# Patient Record
Sex: FEMALE | Race: WHITE | NOT HISPANIC OR LATINO | Employment: FULL TIME | URBAN - METROPOLITAN AREA
[De-identification: names, ages, dates, MRNs, and addresses within clinical notes are randomized per-mention and may not be internally consistent; named-entity substitution may affect disease eponyms.]

---

## 2023-11-27 ENCOUNTER — HOSPITAL ENCOUNTER (OUTPATIENT)
Dept: RADIOLOGY | Facility: CLINIC | Age: 23
Discharge: HOME | End: 2023-11-27
Payer: COMMERCIAL

## 2023-11-27 DIAGNOSIS — M25.512 ACUTE PAIN OF LEFT SHOULDER: ICD-10-CM

## 2023-11-27 PROCEDURE — 73030 X-RAY EXAM OF SHOULDER: CPT | Mod: LT

## 2023-11-27 PROCEDURE — 73030 X-RAY EXAM OF SHOULDER: CPT | Mod: LEFT SIDE | Performed by: RADIOLOGY

## 2024-04-26 DIAGNOSIS — M25.311 SHOULDER INSTABILITY, RIGHT: ICD-10-CM

## 2024-04-29 ENCOUNTER — HOSPITAL ENCOUNTER (OUTPATIENT)
Dept: RADIOLOGY | Facility: HOSPITAL | Age: 24
Discharge: HOME | End: 2024-04-29
Payer: COMMERCIAL

## 2024-04-29 ENCOUNTER — HOSPITAL ENCOUNTER (OUTPATIENT)
Dept: RADIOLOGY | Facility: CLINIC | Age: 24
Discharge: HOME | End: 2024-04-29
Payer: COMMERCIAL

## 2024-04-29 DIAGNOSIS — M25.311 SHOULDER INSTABILITY, RIGHT: ICD-10-CM

## 2024-04-29 PROCEDURE — 73221 MRI JOINT UPR EXTREM W/O DYE: CPT | Mod: RIGHT SIDE | Performed by: RADIOLOGY

## 2024-04-29 PROCEDURE — 73030 X-RAY EXAM OF SHOULDER: CPT | Mod: RT

## 2024-04-29 PROCEDURE — 73221 MRI JOINT UPR EXTREM W/O DYE: CPT | Mod: RT

## 2024-04-29 PROCEDURE — 73030 X-RAY EXAM OF SHOULDER: CPT | Mod: RIGHT SIDE | Performed by: RADIOLOGY

## 2024-04-30 ENCOUNTER — APPOINTMENT (OUTPATIENT)
Dept: RADIOLOGY | Facility: HOSPITAL | Age: 24
End: 2024-04-30
Payer: COMMERCIAL

## 2024-05-02 ENCOUNTER — OFFICE VISIT (OUTPATIENT)
Dept: ORTHOPEDIC SURGERY | Facility: CLINIC | Age: 24
End: 2024-05-02
Payer: COMMERCIAL

## 2024-05-02 DIAGNOSIS — M25.511 RIGHT SHOULDER PAIN, UNSPECIFIED CHRONICITY: ICD-10-CM

## 2024-05-02 PROCEDURE — 99204 OFFICE O/P NEW MOD 45 MIN: CPT | Performed by: STUDENT IN AN ORGANIZED HEALTH CARE EDUCATION/TRAINING PROGRAM

## 2024-05-07 ENCOUNTER — OFFICE VISIT (OUTPATIENT)
Dept: DERMATOLOGY | Facility: CLINIC | Age: 24
End: 2024-05-07
Payer: COMMERCIAL

## 2024-05-07 DIAGNOSIS — L42 PITYRIASIS ROSEA: Primary | ICD-10-CM

## 2024-05-07 PROCEDURE — 99213 OFFICE O/P EST LOW 20 MIN: CPT | Performed by: DERMATOLOGY

## 2024-05-07 RX ORDER — CLOTRIMAZOLE 1 %
CREAM (GRAM) TOPICAL 2 TIMES DAILY
COMMUNITY

## 2024-05-07 RX ORDER — TRIAMCINOLONE ACETONIDE 1 MG/G
CREAM TOPICAL
Qty: 80 G | Refills: 0 | Status: SHIPPED | OUTPATIENT
Start: 2024-05-07

## 2024-05-07 NOTE — PROGRESS NOTES
Subjective     Neha Varela is a 23 y.o. female who presents for the following: Suspicious Skin Lesion (Abdomen, arms, axillae, back, buttocks, right thigh - 2 weeks, Clotrimazole cream 1% with some improvement, but still spreading).   Not itching, painful or weeping. Predominantly abdomen, but do involve other areas as well.     Review of Systems:  No other skin or systemic complaints other than what is documented elsewhere in the note.    The following portions of the chart were reviewed this encounter and updated as appropriate:          Skin Cancer History  No skin cancer on file.      Specialty Problems    None       Objective   Well appearing patient in no apparent distress; mood and affect are within normal limits.    A focused skin examination was performed of the face, chest, abdomen, left and right upper extremities. All findings within normal limits unless otherwise noted below.    Assessment/Plan   1. Pityriasis rosea  Resolving hypopigmented patch on the left forearm.  Multiple pink to skin colored papules and plaques with collarette of scale    The nature of the diagnosis was reviewed with the patient today.  Pityriasis rosea is not a contagious rash, is self-limiting (typically self resolves in 6-10 weeks) and may or may not be symptomatic.  It is likely an immune response to an asymptomatic viral infection.    Can start triamcinolone 0.1% cream 2x daily x 2 weeks to decrease appearance, otherwise with time will likely resolve.    If not improved or worsening after 6-8 weeks please contact office.    triamcinolone (Kenalog) 0.1 % cream  Apply to spots on the abdomen and arms 2x daily x 2 weeks then stop

## 2024-05-22 NOTE — PROGRESS NOTES
"PRIMARY CARE PHYSICIAN: Astrid Hanson MD  REFERRING PROVIDER: No referring provider defined for this encounter.     CONSULT ORTHOPAEDIC: Shoulder Evaluation    ASSESSMENT & PLAN    Impression/Plan: This is a 23-year-old female who is a member of the [facility] team presenting for evaluation of greater than 1 year of right shoulder subjective instability.  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  At this time, I recommended continued nonoperative management with continued physical therapy to work on periscapular musculature.  We discussed the diagnosis of MDI and the need for persistent physical therapy to stabilize the shoulder.  She will follow-up in [facility] training room in the summer prior to her senior season.    SUBJECTIVE  CHIEF COMPLAINT:   Chief Complaint   Patient presents with    Right Shoulder - Follow-up     Right shoulder MRI review        HPI: Neha Varela is a 23 y.o. patient. Neha Varela presents for evaluation of greater than 1 year of right shoulder instability.  She reports a chronic history of shoulder subluxation events.  She denies any shira dislocation and no episodes that required a closed reduction.  The pain is global and extends anteriorly, posteriorly, and laterally.  She does state that she is \"double jointed\".  She has no previous history of dislocation events to other aspects of her body.  No reports of right shoulder surgery.  She has been in physical therapy with her training staff at [facility] since being evaluated initially last summer.  She reports mild improvements but continues to have daily pain and subjective instability.  She denies any distal numbness or tingling.    REVIEW OF SYSTEMS  Constitutional: See HPI for pain assessment, No significant weight loss, recent trauma  Cardiovascular: No chest pain, shortness of breath  Respiratory: No " difficulty breathing, cough  Gastrointestinal: No nausea, vomiting, diarrhea, constipation  Musculoskeletal: Noted in HPI, positive for pain, restricted motion, stiffness  Integumentary: No rashes, easy bruising, redness   Neurological: no numbness or tingling in extremities, no gait disturbances   Psychiatric: No mood changes, memory changes, social issues  Heme/Lymph: no excessive swelling, easy bruising, excessive bleeding  ENT: No hearing changes  Eyes: No vision changes    Past Medical History:   Diagnosis Date    Pain in unspecified ankle and joints of unspecified foot     Ankle pain    Personal history of other infectious and parasitic diseases     History of chickenpox    Personal history of pneumonia (recurrent)     History of pneumonia    Personal history of traumatic brain injury 01/17/2017    History of concussion        Allergies   Allergen Reactions    Milk Containing Products (Dairy) Diarrhea        No past surgical history on file.     No family history on file.     Social History     Socioeconomic History    Marital status: Single     Spouse name: Not on file    Number of children: Not on file    Years of education: Not on file    Highest education level: Not on file   Occupational History    Not on file   Tobacco Use    Smoking status: Never    Smokeless tobacco: Never   Substance and Sexual Activity    Alcohol use: Not on file    Drug use: Not on file    Sexual activity: Not on file   Other Topics Concern    Not on file   Social History Narrative    Not on file     Social Determinants of Health     Financial Resource Strain: Not on file   Food Insecurity: Not on file   Transportation Needs: Not on file   Physical Activity: Not on file   Stress: Not on file   Social Connections: Not on file   Intimate Partner Violence: Not on file   Housing Stability: Not on file        CURRENT MEDICATIONS:   Current Outpatient Medications   Medication Sig Dispense Refill    cetirizine HCl (CETIRIZINE ORAL) Take by  mouth.      clotrimazole (Lotrimin) 1 % cream Apply topically 2 times a day.      triamcinolone (Kenalog) 0.1 % cream Apply to spots on the abdomen and arms 2x daily x 2 weeks then stop 80 g 0     No current facility-administered medications for this visit.        OBJECTIVE    PHYSICAL EXAM      1/3/2023    10:09 AM   Vitals   Systolic 122   Diastolic 75   Heart Rate 64   Weight (lb) 135      There is no height or weight on file to calculate BMI.    GENERAL: A/Ox3, NAD. Appears healthy, well nourished  PSYCHIATRIC: Mood stable, appropriate memory recall  EYES: EOM intact, no scleral icterus  CARDIAC: regular rate  LUNGS: Breathing non-labored  SKIN: no erythema, rashes, or ecchymoses     MUSCULOSKELETAL:  This is a well-appearing patient in no acute distress.  Beighton: 5/9  Positive sulcus sign to the right shoulder.  There is no tenderness to palpation along the SC joint, AC joint, clavicle, acromion, or spine of the scapula.  There is no tenderness along the proximal aspect of the biceps tendon.  Active range of motion: forward flexion 160 degrees, abduction 150 degrees, external rotation 45 degrees, internal rotation to T7.  Strength: 5/5 to the supraspinatus, infraspinatus, subscapularis.  Negative Hornblower's.  Stability: Anterior/Posterior load and shift stable  Negative Speed's and Yergason's.  Negative Bee's.  Negative Neer and Marin.  The patient is firing the AIN/PIN/median/radial/ulnar/axillary distributions.  The patient is sensate to light touch in the median/radial/ulnar/axillary distributions.  2+ radial pulse.    NEUROVASCULAR:  - Neurovascular Status: sensation intact to light touch distally, upper extremity motor grossly intact  - Capillary refill brisk at extremities, radial pulse 2+    Imaging: Multiple views of the affected right shoulder(s) demonstrate: No evidence of fracture or dislocation.  There is well-preserved glenohumeral joint space.  Normal acromiohumeral interval.   X-rays  were personally reviewed and interpreted by me.  Radiology reports were reviewed by me as well, if readily available at the time.    MRI of the right shoulder dated 4/29/2024 reveals tearing at the chondral labral junction of the posterior glenoid/labrum.  There is edema in the lateral clavicle near the acromioclavicular joint.  Otherwise unremarkable MRI of the right shoulder.    Marvin Sun MD, MPH  Attending Surgeon  Sports Medicine Orthopaedic Surgery  [facility] [place]

## 2024-06-14 ENCOUNTER — TELEPHONE (OUTPATIENT)
Dept: ORTHOPEDIC SURGERY | Facility: HOSPITAL | Age: 24
End: 2024-06-14
Payer: COMMERCIAL

## 2024-06-14 NOTE — TELEPHONE ENCOUNTER
I left a voicemail asking Neha to call the office back so that we can set up an appointment ( office visit) to discuss her shoulder.

## 2024-06-18 ENCOUNTER — TELEPHONE (OUTPATIENT)
Dept: ORTHOPEDIC SURGERY | Facility: HOSPITAL | Age: 24
End: 2024-06-18
Payer: COMMERCIAL

## 2024-06-18 NOTE — TELEPHONE ENCOUNTER
Neha called and we were going back and forth with 3 calls to make her appointment, but she is in [place] and there is bad reception where she is at. She will call back when she can to try and make the appointment.

## 2024-07-29 ENCOUNTER — TELEPHONE (OUTPATIENT)
Dept: ORTHOPEDIC SURGERY | Facility: HOSPITAL | Age: 24
End: 2024-07-29
Payer: COMMERCIAL

## 2024-07-29 NOTE — TELEPHONE ENCOUNTER
Left another message regarding her shoulder and needs an appointment. Left voicemail on [phone].

## 2024-08-06 ENCOUNTER — APPOINTMENT (OUTPATIENT)
Dept: ORTHOPEDIC SURGERY | Facility: CLINIC | Age: 24
End: 2024-08-06
Payer: COMMERCIAL

## 2024-08-06 DIAGNOSIS — M25.311 MULTIDIRECTIONAL INSTABILITY OF RIGHT GLENOHUMERAL JOINT: Primary | ICD-10-CM

## 2024-08-06 PROCEDURE — 99213 OFFICE O/P EST LOW 20 MIN: CPT | Performed by: STUDENT IN AN ORGANIZED HEALTH CARE EDUCATION/TRAINING PROGRAM

## 2024-08-06 ASSESSMENT — PAIN SCALES - GENERAL: PAINLEVEL_OUTOF10: 4

## 2024-08-06 ASSESSMENT — PAIN - FUNCTIONAL ASSESSMENT: PAIN_FUNCTIONAL_ASSESSMENT: 0-10

## 2024-08-06 ASSESSMENT — PAIN DESCRIPTION - DESCRIPTORS: DESCRIPTORS: DISCOMFORT

## 2024-08-06 NOTE — PROGRESS NOTES
PRIMARY CARE PHYSICIAN: No primary care provider on file.    ORTHOPAEDIC FOLLOW-UP: Shoulder Evaluation        ASSESSMENT & PLAN    Impression: 24 y.o. female presenting for follow-up evaluation of greater than 1 year of right shoulder multidirectional instability.  At this time, she has failed greater than 6 months of physical therapy and continues to have daily subjective instability and pain.  We discussed at this time we may consider operative intervention with capsular plication and rotator interval closure.  Risk of the procedure include but are not limited to blood loss, infection, nerve damage, persistent pain, persistent instability, loss of motion, recurrent surgery, blood clot, and death.  At this time she would like to consider surgical options and will contact the office with how she would like to proceed.  All questions were answered.    R shoulder capsular plication and posterior labrum, December. Josie Call.    Plan:   I reviewed with the patient the nature of their diagnosis.  I reviewed their imaging studies with them.    Based on the history, physical exam and imaging studies above, the patient's presentation is consistent with the above diagnosis.  I had a long discussion with the patient regarding their presentation and the treatment options.  We discussed initial nonoperative versus operative management options as well as potential further diagnostic imaging.     Follow-Up: Patient will follow-up for surgical intervention when he elected for.    At the end of the visit, all questions were answered in full. The patient is in agreement with the plan and recommendations. They will call the office with any questions/concerns.      Note dictated with NuMedii software. Completed without full typed error editing and sent to avoid delay.       SUBJECTIVE  CHIEF COMPLAINT:   Chief Complaint   Patient presents with    Right Shoulder - Follow-up     Is still going to PT but stats not  nilay.         HPI: Neha Varela is a 24 y.o. patient. Neha Varela is a member of the [employer] who presents for greater than 1 year of right shoulder subjective instability and pain.  She has been participating in physical therapy for greater than 1 year and reports minimal relief from her symptoms.  She has daily instability events associated with pain.  She is unable to participate in cheer/stop because of these issues.  This issue has been bothering her daily including with nonathletic activities and she presents for discussion of further management options.    REVIEW OF SYSTEMS  Constitutional: See HPI for pain assessment, No significant weight loss, recent trauma  Cardiovascular: No chest pain, shortness of breath  Respiratory: No difficulty breathing, cough  Gastrointestinal: No nausea, vomiting, diarrhea, constipation  Musculoskeletal: Noted in HPI, positive for pain, restricted motion, stiffness  Integumentary: No rashes, easy bruising, redness   Neurological: no numbness or tingling in extremities, no gait disturbances   Psychiatric: No mood changes, memory changes, social issues  Heme/Lymph: no excessive swelling, easy bruising, excessive bleeding  ENT: No hearing changes  Eyes: No vision changes    Past Medical History:   Diagnosis Date    Pain in unspecified ankle and joints of unspecified foot     Ankle pain    Personal history of other infectious and parasitic diseases     History of chickenpox    Personal history of pneumonia (recurrent)     History of pneumonia    Personal history of traumatic brain injury 01/17/2017    History of concussion        Allergies   Allergen Reactions    Milk Containing Products (Dairy) Diarrhea        History reviewed. No pertinent surgical history.     No family history on file.     Social History     Socioeconomic History    Marital status: Single     Spouse name: Not on file    Number of children: Not on file    Years of education: Not on file     Highest education level: Not on file   Occupational History    Not on file   Tobacco Use    Smoking status: Never    Smokeless tobacco: Never   Substance and Sexual Activity    Alcohol use: Not on file    Drug use: Not on file    Sexual activity: Not on file   Other Topics Concern    Not on file   Social History Narrative    Not on file     Social Determinants of Health     Financial Resource Strain: Not on file   Food Insecurity: Not on file   Transportation Needs: Not on file   Physical Activity: Not on file   Stress: Not on file   Social Connections: Not on file   Intimate Partner Violence: Not on file   Housing Stability: Not on file        CURRENT MEDICATIONS:   Current Outpatient Medications   Medication Sig Dispense Refill    cetirizine HCl (CETIRIZINE ORAL) Take by mouth.      clotrimazole (Lotrimin) 1 % cream Apply topically 2 times a day.      triamcinolone (Kenalog) 0.1 % cream Apply to spots on the abdomen and arms 2x daily x 2 weeks then stop 80 g 0     No current facility-administered medications for this visit.        OBJECTIVE    PHYSICAL EXAM      1/3/2023    10:09 AM   Vitals   Systolic 122   Diastolic 75   Heart Rate 64   Weight (lb) 135      There is no height or weight on file to calculate BMI.    GENERAL: A/Ox3, NAD. Appears healthy, well nourished  PSYCHIATRIC: Mood stable, appropriate memory recall  EYES: EOM intact, no scleral icterus  CARDIOVASCULAR: Palpable peripheral pulses  LUNGS: Breathing non-labored on room air  SKIN: no erythema, rashes, or ecchymosis     MUSCULOSKELETAL:  This is a well-appearing patient in no acute distress.  Beighton: 5/9  3+ sulcus sign to the right shoulder.  This is persistent with the shoulder in 30 degrees of external rotation.  There is no tenderness to palpation along the SC joint, AC joint, clavicle, acromion, or spine of the scapula.  There is no tenderness along the proximal aspect of the biceps tendon.  Active range of motion: forward flexion 160  degrees, abduction 150 degrees, external rotation 45 degrees, internal rotation to T7.  Strength: 5/5 to the supraspinatus, infraspinatus, subscapularis.  Negative Hornblower's.  Stability: Anterior/Posterior load and shift stable  Negative Speed's and Yergason's.  Negative [place]'s.  Negative Neer and Marin.  The patient is firing the AIN/PIN/median/radial/ulnar/axillary distributions.  The patient is sensate to light touch in the median/radial/ulnar/axillary distributions.  2+ radial pulse.    NEUROVASCULAR:  - Neurovascular Status: sensation intact to light touch distally, upper extremity motor grossly intact  - Capillary refill brisk at extremities, peripheral pulses 2+    Imaging: No new imaging obtained today.      Marvin Sun MD, MPH  Attending Surgeon    Sports Medicine Orthopaedic Surgery  [facility] [place]  [facility] [employer]

## 2024-08-13 ENCOUNTER — TELEPHONE (OUTPATIENT)
Dept: ORTHOPEDIC SURGERY | Facility: HOSPITAL | Age: 24
End: 2024-08-13
Payer: COMMERCIAL

## 2024-08-13 NOTE — TELEPHONE ENCOUNTER
Left a message for Neha to call back so that we can figure out a surgery date if she has made up her mind.

## 2024-08-22 ENCOUNTER — TELEPHONE (OUTPATIENT)
Dept: ORTHOPEDIC SURGERY | Facility: HOSPITAL | Age: 24
End: 2024-08-22
Payer: COMMERCIAL

## 2024-10-31 ENCOUNTER — PREP FOR PROCEDURE (OUTPATIENT)
Dept: ORTHOPEDIC SURGERY | Facility: CLINIC | Age: 24
End: 2024-10-31
Payer: COMMERCIAL

## 2024-10-31 DIAGNOSIS — M25.511 RIGHT SHOULDER PAIN, UNSPECIFIED CHRONICITY: Primary | ICD-10-CM

## 2024-10-31 DIAGNOSIS — M25.311 MULTIDIRECTIONAL INSTABILITY OF RIGHT GLENOHUMERAL JOINT: Primary | ICD-10-CM

## 2024-11-05 DIAGNOSIS — M25.511 RIGHT SHOULDER PAIN, UNSPECIFIED CHRONICITY: Primary | ICD-10-CM

## 2024-11-15 ENCOUNTER — CLINICAL SUPPORT (OUTPATIENT)
Dept: PREADMISSION TESTING | Facility: HOSPITAL | Age: 24
End: 2024-11-15
Payer: COMMERCIAL

## 2024-11-15 VITALS — HEIGHT: 65 IN | WEIGHT: 125 LBS | BODY MASS INDEX: 20.83 KG/M2

## 2024-11-15 DIAGNOSIS — M25.511 RIGHT SHOULDER PAIN, UNSPECIFIED CHRONICITY: ICD-10-CM

## 2024-11-15 RX ORDER — ASCORBIC ACID 500 MG
500 TABLET ORAL DAILY
COMMUNITY

## 2024-11-15 ASSESSMENT — ENCOUNTER SYMPTOMS
DYSPNEA AT REST: 0
VOMITING: 0
BRUISES/BLEEDS EASILY: 0
FEVER: 0
CHILLS: 0
WEAKNESS: 0
SHORTNESS OF BREATH: 0
TREMORS: 0
DIARRHEA: 0
WOUND: 0

## 2024-11-15 NOTE — CPM/PAT NURSE NOTE
CPM/PAT Nurse Note      Name: Neha Varela (Neha Varela)  /Age: 2000/ y.o.       Past Medical History:   Diagnosis Date    Pain in unspecified ankle and joints of unspecified foot     Ankle pain    Personal history of other infectious and parasitic diseases     History of chickenpox    Personal history of pneumonia (recurrent)     History of pneumonia    Personal history of traumatic brain injury 2017    History of concussion    Seasonal allergic reaction        Past Surgical History:   Procedure Laterality Date    UPPER GASTROINTESTINAL ENDOSCOPY      WISDOM TOOTH EXTRACTION Bilateral        Patient  has no history on file for sexual activity.    Family History   Problem Relation Name Age of Onset    Melanoma Maternal Grandfather         Allergies   Allergen Reactions    Milk Containing Products (Dairy) Diarrhea       Prior to Admission medications    Medication Sig Start Date End Date Taking? Authorizing Provider   ascorbic acid (Vitamin C) 500 mg tablet Take 1 tablet (500 mg) by mouth once daily.   Yes Historical Provider, MD   cetirizine HCl (CETIRIZINE ORAL) Take by mouth once daily as needed.   Yes Historical Provider, MD   CHOLECALCIFEROL, VITAMIN D3, ORAL Take by mouth once daily.   Yes Historical Provider, MD   MAGNESIUM ORAL Take by mouth once daily.   Yes Historical Provider, MD   ZINC ORAL Take by mouth once daily.   Yes Historical Provider, MD   triamcinolone (Kenalog) 0.1 % cream Apply to spots on the abdomen and arms 2x daily x 2 weeks then stop  Patient not taking: Reported on 11/15/2024 5/7/24   Ale Perla DO   clotrimazole (Lotrimin) 1 % cream Apply topically 2 times a day.  Patient not taking: Reported on 11/15/2024  11/15/24  Historical Provider, MD SILVA ROS:   Constitutional:    no fever   no chills  Neuro/Psych:    no weakness   no tremors  Eyes:    use of corrective lenses  Ears:    no hearing aides  Nose:   Mouth:   Throat:    no throat pain  Neck:    Cardio:    no chest pain   no dyspnea  Respiratory:    no shortness of breath  Endocrine:   GI:    no diarrhea   no vomiting  :   Musculoskeletal:   Hematologic:    does not bruise/bleed easily   no history of blood transfusion  Skin:   no sores/wound   no rash      DASI Risk Score    No data to display       Caprini DVT Assessment    No data to display       Modified Frailty Index    No data to display       CHADS2 Stroke Risk  Current as of 6 days ago        N/A 3 to 100%: High Risk   2 to < 3%: Medium Risk   0 to < 2%: Low Risk     Last Change: N/A          This score determines the patient's risk of having a stroke if the patient has atrial fibrillation.        This score is not applicable to this patient. Components are not calculated.          Revised Cardiac Risk Index    No data to display       Apfel Simplified Score    No data to display       Risk Analysis Index Results This Encounter    No data found in the last 10 encounters.       Stop Bang Score      Flowsheet Row Clinical Support from 11/15/2024 in [facility]   Do you snore loudly? 0 filed at 11/15/2024 1535   Do you often feel tired or fatigued after your sleep? 0 filed at 11/15/2024 1535   Has anyone ever observed you stop breathing in your sleep? 0 filed at 11/15/2024 1535   Do you have or are you being treated for high blood pressure? 0 filed at 11/15/2024 1535   Recent BMI (Calculated) 20.8 filed at 11/15/2024 1535   Is BMI greater than 35 kg/m2? 0=No filed at 11/15/2024 1535   Age older than 50 years old? 0=No filed at 11/15/2024 1535   Gender - Male 0=No filed at 11/15/2024 1535          Prodigy: High Risk  Total Score: 0          ARISCAT Score for Postoperative Pulmonary Complications    No data to display       Mckenzie Perioperative Risk for Myocardial Infarction or Cardiac Arrest (JAKE)    No data to display         Nurse Plan of Action:

## 2024-11-15 NOTE — PREPROCEDURE INSTRUCTIONS
Current Medications   Medication Instructions    ascorbic acid (Vitamin C) 500 mg tablet Hold 7 days prior    cetirizine HCl (CETIRIZINE ORAL) Hold day of surgery    CHOLECALCIFEROL, VITAMIN D3, ORAL Hold 7 days prior to surgery    MAGNESIUM ORAL Hold 7 days prior to surgery    ZINC ORAL Hold 7 days prior to surgery                       NPO Instructions:    Do not eat any food after midnight the night before your surgery/procedure.    Additional Instructions:     Seven/Six Days before Surgery:  Review your medication instructions, stop indicated medications  Five Days before Surgery:  Review your medication instructions, stop indicated medications  Three Days before Surgery:  Review your medication instructions, stop indicated medications  The Day before Surgery:  Review your medication instructions, stop indicated medications  You will be contacted regarding the time of your arrival to facility and surgery time  Do not eat any food after Midnight  Day of Surgery:  Review your medication instructions, take indicated medications  Wear  comfortable loose fitting clothing  Do not use moisturizers, creams, lotions or perfume  All jewelry and valuables should be left at home  PAT PRE-OPERATIVE INSTRUCTIONS    [facility]  [id number] [place] [address]  [phone]    Please let your surgeon know if:      You develop:  Open sores, shingles, burning or painful urination as these may increase your risk of an infection.   Fever=100.4 or greater   New or worsening cold or flu symptoms ( cough, shortness of breath, sore throat, respiratory distress, headache, fatigue, GI symptoms)   You no longer wish to have the surgery.   Any other personal circumstances change that may lead to the need to cancel or defer this surgery-such as being sick or getting admitted to any hospital within one week of your planned procedure.    Your contact details change, such as a change of address or  phone number.    Starting now:     Please DO NOT drink alcohol or smoke for 24 hours before surgery. It is well known that quitting smoking can make a huge difference to your health and recovery from surgery. The longer you abstain from smoking, the better your chances of a healthy recovery. If you need help with quitting, call 5-706-QUIT-NOW to be connected to a trained counselor who will discuss the best methods to help you quit.     Before your surgery:    Please stop all supplements/ vitamins 7 days prior to surgery (or as directed by your surgeon).   Please stop taking NSAID pain medicine such as Advil, Ibuprofen, and Motrin 7 days before surgery.    If you develop any fever, cough, cold, rashes, cuts, scratches, scrapes, urinary symptoms or infection anywhere on your body (including teeth and gums) prior to surgery, please call your surgeon’s office as soon as possible. This may require treatment to reduce the chance of cancellation on the day of surgery.    The day before your surgery:   DIET- Do not eat any food after MIDNIGHT.   Get a good night’s rest.  Use the special soap for bathing if you have been instructed to use one.    Scheduled surgery times may change and you will be notified if this occurs - please check your personal voicemail for any updates.     On the morning of surgery:   Wear comfortable, loose fitting clothes which open in the front.   Shower and please do not wear moisturizers, creams, lotions, deodorants, makeup or perfume.    Please bring with you to surgery:   Photo ID and insurance card   Current list of medicines and allergies   Pacemaker/ Defibrillator/Heart stent cards as well as remote controls for implanted devices    CPAP machine and mask    Slings/ splints/ crutches   A copy of your complete advanced directive/DHPOA.    Please do NOT bring with you to surgery:   All jewelry and valuables should be left at home.   Prosthetic devices such as contact lenses, glasses, hearing  aids, dentures, eyelash extensions, hairpins and body piercings must be removed prior to going in to the surgical suite. If you have a case for these items, please bring it with you on surgery day.    *Patients under the age of 18: A responsible adult must be present and remaining in the building throughout the surgical visit.    After outpatient surgery:   A responsible adult MUST accompany you at the time of discharge and stay with you for 24 hours after your surgery. You may NOT drive yourself home after surgery.    Do not drive, operate machinery, make critical decisions or do activities that require co-ordination or balance until after a night’s sleep.   Do not drink alcoholic beverages for 24 hours.   Instructions for resuming your medications will be provided by your surgeon.    CALL YOUR DOCTOR AFTER SURGERY IF YOU HAVE:     Chills and/or a fever of 101° F or higher.    Redness, swelling, pus or drainage from your surgical wound or a bad smell from the wound.    Lightheadedness, fainting or confusion.    Persistent vomiting (throwing up) and are not able to eat or drink for 12 hours.    Three or more loose, watery bowel movements in 24 hours (diarrhea).   Difficulty or pain while urinating( after non-urological surgery)    Pain and swelling in your legs, especially if it is only on one side.    Difficulty breathing or are breathing faster than normal.    Any new concerning symptoms.      Reviewed pre-op instructions with patient, states understanding and denies further questions at this time.      Take Care                                                  (0) independent

## 2024-11-15 NOTE — PERIOPERATIVE NURSING NOTE
Spoke with patient and completed RN PAT screening call. Discussed preoperative surgical instructions and education. Chart updated per information provided by patient. Patient had no further questions at this time. In person PAT appointment not requested for patient per patient history.

## 2024-12-02 ENCOUNTER — APPOINTMENT (OUTPATIENT)
Dept: PREADMISSION TESTING | Facility: HOSPITAL | Age: 24
End: 2024-12-02
Payer: COMMERCIAL

## 2024-12-14 ENCOUNTER — ANESTHESIA EVENT (OUTPATIENT)
Dept: OPERATING ROOM | Facility: HOSPITAL | Age: 24
End: 2024-12-14
Payer: COMMERCIAL

## 2024-12-14 RX ORDER — ONDANSETRON 4 MG/1
4 TABLET, ORALLY DISINTEGRATING ORAL ONCE AS NEEDED
Status: CANCELLED | OUTPATIENT
Start: 2024-12-14

## 2024-12-14 RX ORDER — OXYCODONE HYDROCHLORIDE 5 MG/1
5 TABLET ORAL ONCE AS NEEDED
Status: CANCELLED | OUTPATIENT
Start: 2024-12-14

## 2024-12-14 RX ORDER — ONDANSETRON HYDROCHLORIDE 2 MG/ML
4 INJECTION, SOLUTION INTRAVENOUS EVERY 8 HOURS PRN
Status: CANCELLED | OUTPATIENT
Start: 2024-12-14

## 2024-12-14 RX ORDER — OXYCODONE HCL 10 MG/1
10 TABLET, FILM COATED, EXTENDED RELEASE ORAL ONCE AS NEEDED
Status: CANCELLED | OUTPATIENT
Start: 2024-12-14

## 2024-12-16 ENCOUNTER — HOSPITAL ENCOUNTER (OUTPATIENT)
Facility: HOSPITAL | Age: 24
Setting detail: OUTPATIENT SURGERY
Discharge: HOME | End: 2024-12-16
Attending: STUDENT IN AN ORGANIZED HEALTH CARE EDUCATION/TRAINING PROGRAM | Admitting: STUDENT IN AN ORGANIZED HEALTH CARE EDUCATION/TRAINING PROGRAM
Payer: COMMERCIAL

## 2024-12-16 ENCOUNTER — ANESTHESIA (OUTPATIENT)
Dept: OPERATING ROOM | Facility: HOSPITAL | Age: 24
End: 2024-12-16
Payer: COMMERCIAL

## 2024-12-16 VITALS
DIASTOLIC BLOOD PRESSURE: 86 MMHG | WEIGHT: 127.87 LBS | TEMPERATURE: 97.9 F | RESPIRATION RATE: 18 BRPM | SYSTOLIC BLOOD PRESSURE: 129 MMHG | OXYGEN SATURATION: 100 % | HEIGHT: 65 IN | BODY MASS INDEX: 21.3 KG/M2 | HEART RATE: 60 BPM

## 2024-12-16 DIAGNOSIS — G89.18 POSTOPERATIVE PAIN: Primary | ICD-10-CM

## 2024-12-16 LAB — HCG UR QL IA.RAPID: NEGATIVE

## 2024-12-16 PROCEDURE — 2500000004 HC RX 250 GENERAL PHARMACY W/ HCPCS (ALT 636 FOR OP/ED)

## 2024-12-16 PROCEDURE — 2500000004 HC RX 250 GENERAL PHARMACY W/ HCPCS (ALT 636 FOR OP/ED): Performed by: ANESTHESIOLOGY

## 2024-12-16 PROCEDURE — 2500000004 HC RX 250 GENERAL PHARMACY W/ HCPCS (ALT 636 FOR OP/ED): Mod: JZ

## 2024-12-16 PROCEDURE — 2500000004 HC RX 250 GENERAL PHARMACY W/ HCPCS (ALT 636 FOR OP/ED): Performed by: ANESTHESIOLOGIST ASSISTANT

## 2024-12-16 PROCEDURE — 2500000004 HC RX 250 GENERAL PHARMACY W/ HCPCS (ALT 636 FOR OP/ED): Performed by: STUDENT IN AN ORGANIZED HEALTH CARE EDUCATION/TRAINING PROGRAM

## 2024-12-16 PROCEDURE — 7100000002 HC RECOVERY ROOM TIME - EACH INCREMENTAL 1 MINUTE: Performed by: STUDENT IN AN ORGANIZED HEALTH CARE EDUCATION/TRAINING PROGRAM

## 2024-12-16 PROCEDURE — 3700000001 HC GENERAL ANESTHESIA TIME - INITIAL BASE CHARGE: Performed by: STUDENT IN AN ORGANIZED HEALTH CARE EDUCATION/TRAINING PROGRAM

## 2024-12-16 PROCEDURE — A29806 PR SHLDR ARTHROSCOP,SURG,CAPSULORRHAPHY: Performed by: ANESTHESIOLOGY

## 2024-12-16 PROCEDURE — 2780000003 HC OR 278 NO HCPCS: Performed by: STUDENT IN AN ORGANIZED HEALTH CARE EDUCATION/TRAINING PROGRAM

## 2024-12-16 PROCEDURE — 7100000010 HC PHASE TWO TIME - EACH INCREMENTAL 1 MINUTE: Performed by: STUDENT IN AN ORGANIZED HEALTH CARE EDUCATION/TRAINING PROGRAM

## 2024-12-16 PROCEDURE — A4649 SURGICAL SUPPLIES: HCPCS | Performed by: STUDENT IN AN ORGANIZED HEALTH CARE EDUCATION/TRAINING PROGRAM

## 2024-12-16 PROCEDURE — 3600000010 HC OR TIME - EACH INCREMENTAL 1 MINUTE - PROCEDURE LEVEL FIVE: Performed by: STUDENT IN AN ORGANIZED HEALTH CARE EDUCATION/TRAINING PROGRAM

## 2024-12-16 PROCEDURE — 2500000004 HC RX 250 GENERAL PHARMACY W/ HCPCS (ALT 636 FOR OP/ED): Performed by: NURSE ANESTHETIST, CERTIFIED REGISTERED

## 2024-12-16 PROCEDURE — A29806 PR SHLDR ARTHROSCOP,SURG,CAPSULORRHAPHY: Performed by: NURSE ANESTHETIST, CERTIFIED REGISTERED

## 2024-12-16 PROCEDURE — 3600000005 HC OR TIME - INITIAL BASE CHARGE - PROCEDURE LEVEL FIVE: Performed by: STUDENT IN AN ORGANIZED HEALTH CARE EDUCATION/TRAINING PROGRAM

## 2024-12-16 PROCEDURE — 2500000005 HC RX 250 GENERAL PHARMACY W/O HCPCS: Performed by: STUDENT IN AN ORGANIZED HEALTH CARE EDUCATION/TRAINING PROGRAM

## 2024-12-16 PROCEDURE — 81025 URINE PREGNANCY TEST: CPT | Performed by: STUDENT IN AN ORGANIZED HEALTH CARE EDUCATION/TRAINING PROGRAM

## 2024-12-16 PROCEDURE — 7100000009 HC PHASE TWO TIME - INITIAL BASE CHARGE: Performed by: STUDENT IN AN ORGANIZED HEALTH CARE EDUCATION/TRAINING PROGRAM

## 2024-12-16 PROCEDURE — C1713 ANCHOR/SCREW BN/BN,TIS/BN: HCPCS | Performed by: STUDENT IN AN ORGANIZED HEALTH CARE EDUCATION/TRAINING PROGRAM

## 2024-12-16 PROCEDURE — 29806 SHO ARTHRS SRG CAPSULORRAPHY: CPT | Performed by: STUDENT IN AN ORGANIZED HEALTH CARE EDUCATION/TRAINING PROGRAM

## 2024-12-16 PROCEDURE — 2720000007 HC OR 272 NO HCPCS: Performed by: STUDENT IN AN ORGANIZED HEALTH CARE EDUCATION/TRAINING PROGRAM

## 2024-12-16 PROCEDURE — 7100000001 HC RECOVERY ROOM TIME - INITIAL BASE CHARGE: Performed by: STUDENT IN AN ORGANIZED HEALTH CARE EDUCATION/TRAINING PROGRAM

## 2024-12-16 PROCEDURE — 3700000002 HC GENERAL ANESTHESIA TIME - EACH INCREMENTAL 1 MINUTE: Performed by: STUDENT IN AN ORGANIZED HEALTH CARE EDUCATION/TRAINING PROGRAM

## 2024-12-16 DEVICE — KL 1.8 FIBERTAK, SHOULDER
Type: IMPLANTABLE DEVICE | Site: SHOULDER | Status: FUNCTIONAL
Brand: ARTHREX®

## 2024-12-16 RX ORDER — DOCUSATE SODIUM 100 MG/1
100 CAPSULE, LIQUID FILLED ORAL 2 TIMES DAILY PRN
Qty: 14 CAPSULE | Refills: 0 | Status: SHIPPED | OUTPATIENT
Start: 2024-12-16 | End: 2024-12-23

## 2024-12-16 RX ORDER — ONDANSETRON HYDROCHLORIDE 2 MG/ML
4 INJECTION, SOLUTION INTRAVENOUS ONCE AS NEEDED
Status: DISCONTINUED | OUTPATIENT
Start: 2024-12-16 | End: 2024-12-16 | Stop reason: HOSPADM

## 2024-12-16 RX ORDER — MIDAZOLAM HYDROCHLORIDE 1 MG/ML
2 INJECTION, SOLUTION INTRAMUSCULAR; INTRAVENOUS ONCE
Status: COMPLETED | OUTPATIENT
Start: 2024-12-16 | End: 2024-12-16

## 2024-12-16 RX ORDER — METOCLOPRAMIDE 10 MG/1
10 TABLET ORAL ONCE
Status: DISCONTINUED | OUTPATIENT
Start: 2024-12-16 | End: 2024-12-16 | Stop reason: HOSPADM

## 2024-12-16 RX ORDER — HYDRALAZINE HYDROCHLORIDE 20 MG/ML
10 INJECTION INTRAMUSCULAR; INTRAVENOUS EVERY 30 MIN PRN
Status: DISCONTINUED | OUTPATIENT
Start: 2024-12-16 | End: 2024-12-16 | Stop reason: HOSPADM

## 2024-12-16 RX ORDER — FENTANYL CITRATE 50 UG/ML
50 INJECTION, SOLUTION INTRAMUSCULAR; INTRAVENOUS ONCE
Status: COMPLETED | OUTPATIENT
Start: 2024-12-16 | End: 2024-12-16

## 2024-12-16 RX ORDER — NORETHINDRONE AND ETHINYL ESTRADIOL 0.5-0.035
50 KIT ORAL ONCE
Status: DISCONTINUED | OUTPATIENT
Start: 2024-12-16 | End: 2024-12-16 | Stop reason: HOSPADM

## 2024-12-16 RX ORDER — HYDROMORPHONE HYDROCHLORIDE 0.2 MG/ML
0.1 INJECTION INTRAMUSCULAR; INTRAVENOUS; SUBCUTANEOUS EVERY 5 MIN PRN
Status: DISCONTINUED | OUTPATIENT
Start: 2024-12-16 | End: 2024-12-16 | Stop reason: HOSPADM

## 2024-12-16 RX ORDER — ASPIRIN 325 MG
325 TABLET, DELAYED RELEASE (ENTERIC COATED) ORAL DAILY
Qty: 14 TABLET | Refills: 0 | Status: SHIPPED | OUTPATIENT
Start: 2024-12-16 | End: 2024-12-30

## 2024-12-16 RX ORDER — CEFAZOLIN SODIUM 2 G/100ML
2 INJECTION, SOLUTION INTRAVENOUS ONCE
Status: COMPLETED | OUTPATIENT
Start: 2024-12-16 | End: 2024-12-16

## 2024-12-16 RX ORDER — LABETALOL HYDROCHLORIDE 5 MG/ML
10 INJECTION, SOLUTION INTRAVENOUS ONCE AS NEEDED
Status: DISCONTINUED | OUTPATIENT
Start: 2024-12-16 | End: 2024-12-16 | Stop reason: HOSPADM

## 2024-12-16 RX ORDER — OXYCODONE HYDROCHLORIDE 5 MG/1
5 TABLET ORAL EVERY 6 HOURS PRN
Qty: 28 TABLET | Refills: 0 | Status: SHIPPED | OUTPATIENT
Start: 2024-12-16 | End: 2024-12-23

## 2024-12-16 RX ORDER — FAMOTIDINE 20 MG/1
20 TABLET, FILM COATED ORAL ONCE
Status: DISCONTINUED | OUTPATIENT
Start: 2024-12-16 | End: 2024-12-16 | Stop reason: HOSPADM

## 2024-12-16 RX ORDER — MEPERIDINE HYDROCHLORIDE 25 MG/ML
12.5 INJECTION INTRAMUSCULAR; INTRAVENOUS; SUBCUTANEOUS EVERY 10 MIN PRN
Status: DISCONTINUED | OUTPATIENT
Start: 2024-12-16 | End: 2024-12-16 | Stop reason: HOSPADM

## 2024-12-16 RX ORDER — SODIUM CHLORIDE, SODIUM LACTATE, POTASSIUM CHLORIDE, CALCIUM CHLORIDE 600; 310; 30; 20 MG/100ML; MG/100ML; MG/100ML; MG/100ML
INJECTION, SOLUTION INTRAVENOUS CONTINUOUS PRN
Status: DISCONTINUED | OUTPATIENT
Start: 2024-12-16 | End: 2024-12-16

## 2024-12-16 RX ORDER — DIPHENHYDRAMINE HYDROCHLORIDE 50 MG/ML
12.5 INJECTION INTRAMUSCULAR; INTRAVENOUS ONCE AS NEEDED
Status: DISCONTINUED | OUTPATIENT
Start: 2024-12-16 | End: 2024-12-16 | Stop reason: HOSPADM

## 2024-12-16 RX ORDER — MIDAZOLAM HYDROCHLORIDE 1 MG/ML
1 INJECTION, SOLUTION INTRAMUSCULAR; INTRAVENOUS ONCE AS NEEDED
Status: DISCONTINUED | OUTPATIENT
Start: 2024-12-16 | End: 2024-12-16 | Stop reason: HOSPADM

## 2024-12-16 RX ORDER — KETOROLAC TROMETHAMINE 15 MG/ML
15 INJECTION, SOLUTION INTRAMUSCULAR; INTRAVENOUS ONCE AS NEEDED
Status: DISCONTINUED | OUTPATIENT
Start: 2024-12-16 | End: 2024-12-16 | Stop reason: HOSPADM

## 2024-12-16 RX ORDER — FENTANYL CITRATE 50 UG/ML
INJECTION, SOLUTION INTRAMUSCULAR; INTRAVENOUS AS NEEDED
Status: DISCONTINUED | OUTPATIENT
Start: 2024-12-16 | End: 2024-12-16

## 2024-12-16 RX ORDER — LIDOCAINE HYDROCHLORIDE 10 MG/ML
0.1 INJECTION, SOLUTION EPIDURAL; INFILTRATION; INTRACAUDAL; PERINEURAL ONCE
Status: DISCONTINUED | OUTPATIENT
Start: 2024-12-16 | End: 2024-12-16 | Stop reason: HOSPADM

## 2024-12-16 RX ORDER — ALBUTEROL SULFATE 0.83 MG/ML
2.5 SOLUTION RESPIRATORY (INHALATION) ONCE
Status: DISCONTINUED | OUTPATIENT
Start: 2024-12-16 | End: 2024-12-16 | Stop reason: HOSPADM

## 2024-12-16 RX ORDER — PROPOFOL 10 MG/ML
INJECTION, EMULSION INTRAVENOUS AS NEEDED
Status: DISCONTINUED | OUTPATIENT
Start: 2024-12-16 | End: 2024-12-16

## 2024-12-16 RX ORDER — ONDANSETRON 4 MG/1
4 TABLET, FILM COATED ORAL EVERY 8 HOURS PRN
Qty: 14 TABLET | Refills: 0 | Status: SHIPPED | OUTPATIENT
Start: 2024-12-16 | End: 2024-12-23

## 2024-12-16 RX ORDER — ONDANSETRON HYDROCHLORIDE 2 MG/ML
INJECTION, SOLUTION INTRAVENOUS AS NEEDED
Status: DISCONTINUED | OUTPATIENT
Start: 2024-12-16 | End: 2024-12-16

## 2024-12-16 RX ORDER — ALBUTEROL SULFATE 0.83 MG/ML
2.5 SOLUTION RESPIRATORY (INHALATION) ONCE AS NEEDED
Status: DISCONTINUED | OUTPATIENT
Start: 2024-12-16 | End: 2024-12-16 | Stop reason: HOSPADM

## 2024-12-16 SDOH — HEALTH STABILITY: MENTAL HEALTH: CURRENT SMOKER: 0

## 2024-12-16 ASSESSMENT — PAIN SCALES - GENERAL
PAIN_LEVEL: 0
PAINLEVEL_OUTOF10: 0 - NO PAIN
PAINLEVEL_OUTOF10: 2
PAINLEVEL_OUTOF10: 2

## 2024-12-16 ASSESSMENT — COLUMBIA-SUICIDE SEVERITY RATING SCALE - C-SSRS
1. IN THE PAST MONTH, HAVE YOU WISHED YOU WERE DEAD OR WISHED YOU COULD GO TO SLEEP AND NOT WAKE UP?: NO
2. HAVE YOU ACTUALLY HAD ANY THOUGHTS OF KILLING YOURSELF?: NO

## 2024-12-16 ASSESSMENT — PAIN - FUNCTIONAL ASSESSMENT
PAIN_FUNCTIONAL_ASSESSMENT: 0-10

## 2024-12-16 NOTE — DISCHARGE INSTRUCTIONS
Post Op Instructions: Upper Extremity  Marvin Sun M.D., M.P.H.  Office Phone: [phone]  After Hours Line: [phone]      First PT Appointment: Within 1 week  First Post Op Appointment: In 1-2 weeks  Please make an appointment with a physical therapist of your choice ASAP, preferably starting the day after surgery. We recommend PT 2-3x/week for 6-12 weeks after surgery.     If you have any questions, please read this information in its entirety, and then call with any questions.        MEDICATIONS PRESCRIBED FOR AFTER SURGERY:  Your preferred pharmacy on file is where your medications have been ePrescribed: Please  ASAP  FOR PAIN RELIEF (Narcotics):    You have been provided pain medication after your surgery, please take as directed. Wean off of narcotics as soon as symptoms allow. Take with food. Notify office if nausea, vomiting, headaches, or rash occurs. Other side effects include; drowsiness and constipation.    Use caution when taking TYLENOL or other acetaminophen products while taking Percocet, Norco, or Lorcet as these medications already contain acetaminophen. Do NOT exceed more than 3000mg of Tylenol per day.    We do NOT recommend ibuprofen, Advil, Aleve, Motrin, or NSAID products for 6wks after surgery, as these can delay healing.    You should not drive while taking pain medications as they delay your responses.    Narcotics are addictive and have a high abuse and overdose potential. It is extremely important to take these as directed and not to mix with alcohol or other forms of medication unless approved by the medical team. Please keep all prescribed narcotics LOCKED and away from children. Lastly, please properly dispose of leftover narcotics. Contact your local police department for more info.   o OXYCODONE 5 mg: this is a short-acting medication for pain. You should take 1 or 2 tablets every 4 to 6 hours AS NEEDED. If you are not in pain, you should not take this  medication. Try to wean down your use of this drug as soon as symptoms allow. If 2 tablets every 4 hours are not relieving all your pain please call our office or the MD on Call.     FOR NAUSEA:   o ZOFRAN (Odansteron) 4mg Tablet - this medication is for nausea or vomiting. Place 1 tablet under the tongue every 8 hours as needed for nausea. If you are not nauseous, you do not need to take this medication. Please call our office if you still experience nausea despite taking this medication.    FOR ANTI-COAGULATION (Blood Thinners):   You have been prescribed an anticoagulation medication, to be taken after surgery. This medication is taken to prevent a blood clot from developing in your leg, which is a possible complication after any surgery. This medication is required after all surgeries. You must finish the entire prescription of anticoagulation medication, no matter what type of procedure you had.   o ASPIRIN 325 mg: This is a mild blood thinner. Please take 1 tablet every day as instructed weeks: starting the day after surgery, no matter what kind of procedure you had. Finish the entire prescription bottle, even if you are feeling better.     OTHER MEDICATIONS TO CONSIDER:   o TYLENOL: You can buy this Over The Counter. Some pain medications contain Tylenol, including Norco, Lorcet, and Percocet. Oxycodone and Dilaudid DO NOT contain Tylenol, and it is recommended to add in Tylenol for additional pain control if needed. This can be taken as 325-600mg every 4 hours. The maximum dose for Tylenol per day is 3000mg.   o MIRALAX, COLACE, SENNA OR DOCUSATE: These are over-the-counter mild laxatives and stool softeners for prevention of constipation. We suggest beginning these the day after surgery if you are taking narcotics. Please call the office if you have not had a bowel movement for three days after your surgery.   o BENADRYL: Itching can be common when taking narcotics. Take this as needed for any itching  symptoms. Can be found overthe-counter.    DRESSING/BANDAGE CHANGES:   You may change your surgical dressing three days after surgery, or if you are seeing a physical therapist, you may have them do this for you. Please read these directions in their entirety before beginning a dressing change.    Take down/remove all the bandages and replace with water-proof bandages which you can shower over.   You may notice suture/stitches on your incision, these may be black or they may be clear, like fishing line. These will be removed at your first post-op appointment, or if you are following up elsewhere, they may be removed by your physical therapist or another physician 10-14 days after surgery.      OTHER GENERAL SURGERY INFORMATION  ICE: Swelling and bruising is normal after surgery because fluids are used during surgery. Continuous icing will help to decrease swelling and pain. It is best to ice at least 5-6 times a day for 20-30 minutes. It is very important to always have a protective  between the ice and your skin. You may use ice bags, frozen wraps, frozen peas or a NICE or Game Ready unit (an ice/compression machine). If you have received a NICE machine and have any questions regarding its use, please call the number provided with the machine.    DRIVING: Please do not drive until you are evaluated in the office after surgery. You are considered an impaired  following surgery, and if you choose to drive, your insurance may not cover any accidents that occur.     PHYSICAL THERAPY: This is dependent on your injury and specific procedure. You will be given specific protocol after your surgery.    ACTIVITY RESTRICTIONS/BRACE/SLING: This is dependent on your injury and specific procedure. You may be required to use a brace or sling. The type of surgery you had will dictate how long to wear your brace/sling. Your PT protocol will outline any restrictions you may have with lifting and range of motion. If  you are placed in a sling/brace, it is extremely important to use as directed and make sure you always have the sling on when ambulating (walking). It is important that you follow all instructions regarding activity restrictions as they are intended to promote healing and prevent complications. You may take the brace/sling off if you need to change the bandages, change clothes, or shower (be extra cautious!), or if you are sitting. We recommend wearing the brace even while you are sleeping unless told otherwise y our team.     SIGNS AND SYMPTOMS OF COMPLICATIONS: Although complications are rare the following are a list of concerns you should be aware of:    Infection - increased pain not relieved with medication, fever, chills, redness, swelling or drainage from incision.    Blood Clot - swelling, tenderness, or calf pain to touch or when you move your ankle up and down, shortness of breath and chest pain.    REASONS TO CALL:   ? Fever, chills or sweats   ? Redness, swelling or warmth around the incisions, non-clear drainage from the incision or increased pain in or around the incision   ? Calf swelling, redness, pain or warmth   ? Chest pain, difficulty breathing, or cough   ? Inability to have a bowel movement after 3 days

## 2024-12-16 NOTE — ANESTHESIA PREPROCEDURE EVALUATION
Patient: Neha Varela    Procedure Information       Date/Time: 12/16/24 1125    Procedure: Labral repair with capsulorrhaphy ( Lateral with a beanbag) (Right: Shoulder)    Location: [facility] / Virtual [facility]    Surgeons: Sudhir Sun MD MPH            Relevant Problems   No relevant active problems       Clinical information reviewed:                   NPO Detail:  NPO/Void Status  Time of Last Void: 1001         Physical Exam    Airway  Mallampati: II  TM distance: >3 FB     Cardiovascular   Rhythm: regular  Rate: normal     Dental - normal exam     Pulmonary    Abdominal            Anesthesia Plan    History of general anesthesia?: unknown/emergency  History of complications of general anesthesia?: no    ASA 3     general   (No lab, healthy, never GA)  The patient is not a current smoker.    intravenous induction   Postoperative administration of opioids is intended.  Anesthetic plan and risks discussed with patient.    Plan discussed with CRNA, attending and CAA.

## 2024-12-16 NOTE — ANESTHESIA PROCEDURE NOTES
Airway  Date/Time: 12/16/2024 12:48 PM  Urgency: elective    Airway not difficult    Staffing  Performed: CRNA   Authorized by: Mikhail Iyer DO    Performed by: BREANNA Castaneda-SAM  Patient location during procedure: OR    Indications and Patient Condition  Indications for airway management: anesthesia  Spontaneous ventilation: present  Sedation level: deep  Preoxygenated: yes  Mask difficulty assessment: 1 - vent by mask    Final Airway Details  Final airway type: supraglottic airway      Successful airway: classic  Size 4     Number of attempts at approach: 1

## 2024-12-16 NOTE — ANESTHESIA POSTPROCEDURE EVALUATION
Patient: Neha Varela    Procedure Summary       Date: 12/16/24 Room / Location: [facility] / [facility]    Anesthesia Start: 1232 Anesthesia Stop: 1457    Procedure: Labral repair with capsulorrhaphy ( Lateral with a beanbag) (Right: Shoulder) Diagnosis:       Multidirectional instability of right glenohumeral joint      (Multidirectional instability of right glenohumeral joint [M25.311])    Surgeons: Sudhir Sun MD MPH Responsible Provider: Mikhail Iyer DO    Anesthesia Type: general ASA Status: 3            Anesthesia Type: general    Vitals Value Taken Time   /68 12/16/24 1500   Temp 36.1 °C (97 °F) 12/16/24 1455   Pulse 70 12/16/24 1500   Resp 12 12/16/24 1500   SpO2 97 % 12/16/24 1500       Anesthesia Post Evaluation    Patient location during evaluation: bedside  Patient participation: complete - patient participated  Level of consciousness: awake  Pain score: 0  Pain management: adequate  Airway patency: patent  Two or more strategies used to mitigate risk of obstructive sleep apnea  Cardiovascular status: acceptable  Respiratory status: acceptable  Hydration status: acceptable  Postoperative Nausea and Vomiting: none        There were no known notable events for this encounter.

## 2024-12-16 NOTE — H&P
History Of Present Illness  Neha Varela is a 24 y.o. patient. Neha Varela is a [facility] who presented to the outpatient clinic for greater than 1 year of right shoulder subjective instability and pain.  She has been participating in physical therapy for greater than 1 year and reports minimal relief from her symptoms.  She has daily instability events associated with pain.  She is unable to participate in cheer/stop because of these issues.  This issue has been bothering her daily including with nonathletic activities and she presents for discussion of further management options.      Past Medical History  She has a past medical history of Pain in unspecified ankle and joints of unspecified foot, Personal history of other infectious and parasitic diseases, Personal history of pneumonia (recurrent), Personal history of traumatic brain injury (01/17/2017), and Seasonal allergic reaction.    Surgical History  She has a past surgical history that includes Upper gastrointestinal endoscopy and [place] tooth extraction (Bilateral).     Social History  She reports that she has never smoked. She has never used smokeless tobacco. She reports that she does not currently use alcohol. She reports that she does not use drugs.    Family History  Family History   Problem Relation Name Age of Onset    Melanoma Maternal Grandfather          Allergies  Milk containing products (dairy)    Review of Systems  As per HPI     Physical Exam  This is a well-appearing patient in no acute distress.  Beighton: 5/9  2+ sulcus sign to the right shoulder.  This is persistent with the shoulder in 30 degrees of external rotation.  There is no tenderness to palpation along the SC joint, AC joint, clavicle, acromion, or spine of the scapula.  There is no tenderness along the proximal aspect of the biceps tendon.  Active range of motion: forward flexion 160 degrees, abduction 150 degrees, external rotation 45  "degrees, internal rotation to T7.  Strength: 5/5 to the supraspinatus, infraspinatus, subscapularis.  Negative Hornblower's.  Stability: Anterior/Posterior load and shift stable  Negative Speed's and Yergason's.  Negative Hernando's.  Negative Neer and Marin.  The patient is firing the AIN/PIN/median/radial/ulnar/axillary distributions.  The patient is sensate to light touch in the median/radial/ulnar/axillary distributions.  2+ radial pulse.     Last Recorded Vitals  Blood pressure 111/69, pulse 56, temperature 37.2 °C (99 °F), temperature source Temporal, resp. rate 18, height 1.651 m (5' 5\"), weight 58 kg (127 lb 13.9 oz), SpO2 99%.    Relevant Results      Scheduled medications  albuterol, 2.5 mg, nebulization, Once  famotidine, 20 mg, oral, Once  fentaNYL PF, 50 mcg, intravenous, Once  metoclopramide, 10 mg, oral, Once  midazolam, 2 mg, intravenous, Once      Continuous medications     PRN medications    Results for orders placed or performed during the hospital encounter of 12/16/24 (from the past 24 hours)   hCG, Urine, Qualitative   Result Value Ref Range    HCG, Urine NEGATIVE NEGATIVE       Assessment/Plan   Assessment & Plan  Multidirectional instability of right glenohumeral joint      24 y.o. female presenting for follow-up evaluation of greater than 1 year of right shoulder multidirectional instability.  At this time, she has failed greater than 6 months of physical therapy and continues to have daily subjective instability and pain.  We discussed at this time we may consider operative intervention with capsular plication and rotator interval closure.  Risk of the procedure include but are not limited to blood loss, infection, nerve damage, persistent pain, persistent instability, loss of motion, recurrent surgery, blood clot, and death.  At this time she would like to move forward with surgical intervention.  All questions were answered.        I spent 15 minutes in the professional and overall care " of this patient.      Sudhir Sun MD MPH

## 2024-12-16 NOTE — POST-PROCEDURE NOTE
Patient arrived to PACU on cart, alert and oriented on room air.  Saturation >95%, no distress noted.  Patient has dressing to right shoulder, 2x2 with telfa dry and intact.  Fingers warm to touch, right radial pulse 2+.  Patient denies any pain.  Vitals stable.  Ice pack applied to right shoulder.  Bare hugger applied to patient.

## 2024-12-17 ENCOUNTER — TELEPHONE (OUTPATIENT)
Dept: ORTHOPEDIC SURGERY | Facility: HOSPITAL | Age: 24
End: 2024-12-17
Payer: COMMERCIAL

## 2024-12-17 ENCOUNTER — PROCEDURE VISIT (OUTPATIENT)
Facility: CLINIC | Age: 24
End: 2024-12-17
Payer: COMMERCIAL

## 2024-12-17 PROCEDURE — L3670 SO ACRO/CLAV CAN WEB PRE OTS: HCPCS | Performed by: STUDENT IN AN ORGANIZED HEALTH CARE EDUCATION/TRAINING PROGRAM

## 2024-12-17 NOTE — TELEPHONE ENCOUNTER
Neha is still in pain even after taking the Oxycodone and would like to take Tylenol. I let her mother Tamera know to take two tylenol pills in between the Oxycodone. Neha had surgery yesterday 12/16/24.

## 2024-12-24 NOTE — OP NOTE
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]     Procedures  Labral repair with capsulorrhaphy ( Lateral with a beanbag)  24448 - [place] SURGICAL ARTHROSCOPY SHOULDER CAPSULORRHAPHY      Surgeons      * Sudhir Sun - Primary    Resident/Fellow/Other Assistant:  Surgeons and Role:  * No surgeons found with a matching role *    Staff:   Circulator: Debra Cotter Person: Kathy Cotter Person: Ashley Black Scrub: Kathy    Anesthesia Staff: Anesthesiologist: Mikhail Iyer DO  CRNA: BREANNA Castaneda-SAM  C-AA: EVA Melissa    Procedure Summary  Anesthesia: General  ASA: III  Estimated Blood Loss: minimal mL  Intra-op Medications:   Administrations occurring from 1125 to 1355 on 24:   Medication Name Total Dose   EPINEPHrine (Adrenalin) 1 mg in sodium chloride 0.9 % 3,000 mL irrigation 3 mL   ceFAZolin (Ancef) 2 g in dextrose (iso)  mL 2 g   fentaNYL PF (Sublimaze) injection 50 mcg 50 mcg   midazolam (Versed) injection 2 mg 2 mg   dexAMETHasone (Decadron) 4 mg/mL 4 mg   LR infusion Cannot be calculated   ondansetron 2 mg/mL 4 mg   propofol (Diprivan) injection 10 mg/mL 200 mg              Anesthesia Record               Intraprocedure I/O Totals          Intake    LR infusion 900.00 mL    ceFAZolin (Ancef) 2 g in dextrose (iso)  mL 100.00 mL    Total Intake 1000 mL       Output    Est. Blood Loss 10 mL    Total Output 10 mL       Net    Net Volume 990 mL          Specimen: No specimens collected              Drains and/or Catheters: * None in log *    Tourniquet Times:         Implants:  Implants       Type Name Action Serial No.       Implant SUTURE, FIBERTAK, KNOTLESS 1.8, GEN2 W/P2 - [id number] Implanted      Implant SUTURE, FIBERTAK, KNOTLESS 1.8, GEN2 W/P2 - [id number] Implanted      Implant SUTURE, FIBERTAK, KNOTLESS 1.8, GEN2 W/P2 - [id number] Implanted      Implant SUTURE, FIBERTAK, KNOTLESS 1.8, GEN2 W/P2 - [id number] Implanted      Implant SUTURE, FIBERTAK, KNOTLESS 1.8, GEN2 W/P2 - [id number] Implanted      Implant SUTURE, FIBERTAK, KNOTLESS 1.8, GEN2 W/P2 - [id number] Implanted      Implant SUTURE, FIBERTAK, KNOTLESS 1.8, GEN2 W/P2 - [id number] Implanted               Indications: Neha Varela is an 24 y.o. 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 24 y.o. female presenting for follow-up evaluation of greater than 1 year of right shoulder multidirectional instability.  At this time, she has failed greater than 6 months of physical therapy and continues to have daily subjective instability and pain.  We discussed at this time we may consider operative intervention with capsular plication and rotator interval closure.  Risk of the procedure include but are not limited to blood loss, infection, nerve damage, persistent pain, persistent instability, loss of motion, recurrent surgery, blood clot, and death.  At this time she would like to move forward with surgical intervention.  All questions were answered.     The patient was seen in the preoperative area. The risks, benefits, complications, treatment options, non-operative alternatives, expected recovery and outcomes were discussed with the patient. The possibilities of reaction to medication, pulmonary aspiration, injury to surrounding structures, bleeding, recurrent infection, the need for additional procedures, failure to diagnose a condition, and creating a complication requiring transfusion or operation were discussed with the patient. The patient concurred with the proposed plan, giving informed consent.  The site  of surgery was properly noted/marked if necessary per policy. The patient has been actively warmed in preoperative area. Preoperative antibiotics have been ordered and given within 1 hours of incision. Venous thrombosis prophylaxis have been ordered including bilateral sequential compression devices    Procedure Details:   INDICATIONS: Neha Varela is a 24 y.o. female presenting with persistent anterior glenohumeral joint instability unresponsive to repeated attempts of conservative measures. We discussed treatment alternatives including continued non-operative management but given the patient's age and functional expectations, the patient wishes to proceed with arthroscopic repair. We discussed risks that include but are not limited to bleeding, infection, nerve damage, stiffness, incomplete pain relief, persistent instability, need for reoperation, blood clot, and death. The patient is in agreement with this plan. All questions were answered and the patient signed the surgical consent form.      PROCEDURE: The patient was met in the preoperative holding area, where the operative site was marked, consents were reviewed and signed, and all preoperative questions were answered. A peripheral nerve block was performed by the Anesthesia Team. The patient was taken to the operating room and placed under general anesthesia. Care was taken to ensure all bony prominences were well padded. The patient was then positioned in a lateral position. The rightshoulder was prepped and draped in usual sterile fashion. A preoperative time-out was performed to confirm correct patient, correct operative site, to ensure all necessary implants and equipment were in the room, as well as to address any anesthesia or nursing concerns. Preoperative antibiotics were administered within 60 minutes of incision time.     EXAMINATION UNDER ANESTHESIA: The patient displayed full passive range of motion: Forward flexion to 170, abduction to  170, external rotation to 60. Anterior load and shift testing of the glenohumeral joint demonstrated 2+ translation. She displayed a positive sulcus sign which did not improve with external rotation.    DIAGNOSTIC ARTHROSCOPY AND FINDINGS: A standard posterolateral arthroscopic portal was made 2 cm medial and inferior to the posterolateral border of the acromion. The arthroscope was introduced into the glenohumeral joint. Under direct visualization, an anterior portal was made in the rotator interval. A diagnostic arthroscopy was performed with the findings as noted below:    There was no significant synovitis in the subcoracoid recess and recess superior to the glenoid labrum. There were no significant cartilaginous changes of the humeral head or glenoid. There was a patulous capsule and a positive drive through sign. There was no significant defect to the anterior glenoid bone stock. The biceps tendon and superior labrum were intact. The subscapularis tendon was intact, inserting into the lesser tuberosity. The supraspinatus was intact, inserting into the greater tuberosity. The posterior cuff was intact, inserting into the greater tuberosity.    CAPSULOLABRAL PREPARATION: With the scope in the posterior portal, I now prepared the anterior labrum with a combination of elevators, a small bone cutting shaver, and a rasp.  Anteriorly I was able to elevate the labrum fully, see the subscapularis fibers posteriorly, and make sure we had adeqaute preparation of the labrum. Of note, this was a bone preserving procedure making sure that we had preserved bone and just freshened up the surfaces. The labrum was able to float freely and back to the anatomic position.  I then moved the camera to the anterior superior portal and under direct visualization made a second posterior lateral portal in anticipation of utilizing this for a posterior labral repair.  I then utilized the posterior portal to elevate the posterior labrum  using a combination of elevator, small bone-cutting shaver, and a rasp.     ARTHROSCOPIC LABRAL REPAIR WITH CAPSULAR SHIFT: After mobilization, the capsulolabral tissue was then shifted and the first anchor was placed at approximately the 6 o'clock position. The capsule was shifted with a labrum from inferior to superior approximately 1 cm and this was secured with a 1.8 mm FiberTak. Another anchor was placed at approximately the 7 o'clock position and a similar capsular shift was performed.  Two additional anchors were then placed at the 8 and 9 o'clock positions to complete the repair and the capsular shift each time shifting the capsule from 0.5 cm to 1 cm while grasping tissue with each bite in order to restore the labral bumper and tightening the capsular tissue. Attention was then turned anteriorly and three additional anchors were placed are the 5, 4, and 3 oclock positions. Excellent restoration of the capsulolabral tissue was achieved. Excellent tension was achieved. These were secured sequentially to retain the capsule and capsular shift as well as the labral repair.    ROTATOR INTERVAL CLOSURE:  At this time attention was turned to closing of the rotator interval. A birdbeak device was used to place a non-absorbable 2-0 suture from the SGHL to the MGL. This was then hand tied to secure fixation.     CLOSING: At this point, final pictures were taken and all instruments were withdrawn from the shoulder. The wounds were closed with 3-0 monocryl subcuticular stitch, dermabond, steri-strips, plains, and tegiderm dressing. The patient was placed in a padded abduction sling, awakened from anesthesia without complication and taken to the PACU in stable condition.     ATTESTATION: I attest that I was the attending surgeon and was present and performed all critical aspects of the procedure.      Complications:  None; patient tolerated the procedure well.    Disposition: PACU - hemodynamically stable.  Condition:  stable       Additional Details: None.    Attending Attestation: I was present and scrubbed for the key portions of the procedure.    Sudhir Sun  Phone Number: [phone]

## 2024-12-26 ENCOUNTER — TELEPHONE (OUTPATIENT)
Dept: ORTHOPEDIC SURGERY | Facility: HOSPITAL | Age: 24
End: 2024-12-26
Payer: COMMERCIAL

## 2024-12-26 NOTE — TELEPHONE ENCOUNTER
I left Neha a message to call the office back to reschedule her appointment on 12/31/24 because the doctor will be in the OR.

## 2024-12-31 ENCOUNTER — APPOINTMENT (OUTPATIENT)
Dept: ORTHOPEDIC SURGERY | Facility: CLINIC | Age: 24
End: 2024-12-31
Payer: COMMERCIAL

## 2025-01-03 ENCOUNTER — APPOINTMENT (OUTPATIENT)
Dept: ORTHOPEDIC SURGERY | Facility: CLINIC | Age: 25
End: 2025-01-03
Payer: COMMERCIAL

## 2025-01-03 DIAGNOSIS — Z98.890 STATUS POST LABRAL REPAIR OF SHOULDER: Primary | ICD-10-CM

## 2025-01-03 PROCEDURE — 99211 OFF/OP EST MAY X REQ PHY/QHP: CPT | Performed by: STUDENT IN AN ORGANIZED HEALTH CARE EDUCATION/TRAINING PROGRAM

## 2025-01-03 ASSESSMENT — PAIN DESCRIPTION - DESCRIPTORS: DESCRIPTORS: SORE

## 2025-01-03 ASSESSMENT — PAIN SCALES - GENERAL: PAINLEVEL_OUTOF10: 3

## 2025-01-03 ASSESSMENT — PAIN - FUNCTIONAL ASSESSMENT: PAIN_FUNCTIONAL_ASSESSMENT: 0-10

## 2025-01-31 ENCOUNTER — APPOINTMENT (OUTPATIENT)
Dept: ORTHOPEDIC SURGERY | Facility: CLINIC | Age: 25
End: 2025-01-31
Payer: COMMERCIAL

## 2025-02-05 ENCOUNTER — APPOINTMENT (OUTPATIENT)
Dept: ORTHOPEDIC SURGERY | Facility: CLINIC | Age: 25
End: 2025-02-05
Payer: COMMERCIAL

## 2025-02-05 DIAGNOSIS — Z98.890 STATUS POST LABRAL REPAIR OF SHOULDER: Primary | ICD-10-CM

## 2025-02-05 PROCEDURE — 99024 POSTOP FOLLOW-UP VISIT: CPT | Performed by: STUDENT IN AN ORGANIZED HEALTH CARE EDUCATION/TRAINING PROGRAM

## 2025-02-05 ASSESSMENT — PAIN - FUNCTIONAL ASSESSMENT: PAIN_FUNCTIONAL_ASSESSMENT: NO/DENIES PAIN

## 2025-02-17 NOTE — PROGRESS NOTES
PRIMARY CARE PHYSICIAN: No primary care provider on file.    ORTHOPAEDIC POSTOPERATIVE VISIT    ASSESSMENT & PLAN    Impression: 24 y.o. female 2 weeks and 4 days postop s/p right shoulder open anterior and posterior capsulorrhaphy with rotator interval closure for multidirectional instability, overall, the patient is doing well.    Plan:   She can discontinue the aspirin for DVT prophylaxis at this time.  She will continue physical therapy as per protocol.     I reviewed the intraoperative findings with the patient and answered all their questions. I reviewed their postoperative timeline and plan with them. They understand the postoperative precautions and the treatment plan going forward.     Follow-Up: Patient will follow-up in 4 weeks.      At the end of the visit, all questions were answered in full. The patient is in agreement with the plan and recommendations. They will call the office with any questions/concerns.      Note dictated with [employer] software. Completed without full typed error editing and sent to avoid delay.      SUBJECTIVE  CHIEF COMPLAINT:   Chief Complaint   Patient presents with    Right Shoulder - Post-op     Sx 12/16/26 Started PT.        HPI: Neha Varela is a 24 y.o. patient. Neha Varela is now postop status post right shoulder arthroscopic anterior and posterior capsulorrhaphy with rotator interval closure for multidirectional instability.  Overall, the patient is recovering well.  She is no longer on narcotic medication and her pain is controlled.  No reported issues with her surgical incisions.  She is working with physical therapy as per protocol.  She remains in her sling as per protocol.  No complaints today.  She denies any distal numbness or tingling.      REVIEW OF SYSTEMS  Constitutional: See HPI for pain assessment, No significant weight loss, recent trauma  Cardiovascular: No chest pain, shortness of breath  Respiratory: No difficulty  breathing, cough  Gastrointestinal: No nausea, vomiting, diarrhea, constipation  Musculoskeletal: Noted in HPI, positive for pain, restricted motion, stiffness  Integumentary: No rashes, easy bruising, redness   Neurological: no numbness or tingling in extremities, no gait disturbances   Psychiatric: No mood changes, memory changes, social issues  Heme/Lymph: no excessive swelling, easy bruising, excessive bleeding  ENT: No hearing changes  Eyes: No vision changes    CURRENT MEDICATIONS:   Current Outpatient Medications   Medication Sig Dispense Refill    ascorbic acid (Vitamin C) 500 mg tablet Take 1 tablet (500 mg) by mouth once daily.      cetirizine HCl (CETIRIZINE ORAL) Take by mouth once daily as needed. (Patient not taking: Reported on 12/16/2024)      CHOLECALCIFEROL, VITAMIN D3, ORAL Take by mouth once daily.      MAGNESIUM ORAL Take by mouth once daily.      triamcinolone (Kenalog) 0.1 % cream Apply to spots on the abdomen and arms 2x daily x 2 weeks then stop (Patient not taking: Reported on 12/16/2024) 80 g 0    ZINC ORAL Take by mouth once daily.       No current facility-administered medications for this visit.        OBJECTIVE    PHYSICAL EXAM      12/16/2024    11:55 AM 12/16/2024     2:55 PM 12/16/2024     3:00 PM 12/16/2024     3:05 PM 12/16/2024     3:20 PM 12/16/2024     3:22 PM 12/16/2024     3:45 PM   Vitals   Systolic 108 112 110 109 106 114 129   Diastolic 59 74 68 74 75 84 86   Heart Rate 75 84 70 65 66 60 60   Temp  36.1 °C (97 °F)   35.8 °C (96.4 °F) 35.8 °C (96.4 °F) 36.6 °C (97.9 °F)   Resp 16 13 12 13 14 13 18      There is no height or weight on file to calculate BMI.    General: Well-appearing, no acute distress    Skin intact bilateral upper and lower extremities  No erythema  No warmth    Detailed examination of the right shoulder demonstrates:  Surgical incision(s) healing well, Steri-Strips in place  No erythema or warmth  No drainage  No ecchymosis  Resolving swelling,  minimal  Biceps contour normal  Shoulder range of motion deferred  Upper extremity motor grossly intact  C5-T1 sensation intact bilaterally  2+ radial pulses bilaterally  Warm and well-perfused, brisk capillary refill    IMAGING:   No new imaging    Marvin Sun MD, MPH  Attending Surgeon    Sports Medicine Orthopaedic Surgery  [facility] [place]  [facility] [employer]

## 2025-03-07 NOTE — PROGRESS NOTES
PRIMARY CARE PHYSICIAN: No primary care provider on file.    ORTHOPAEDIC POSTOPERATIVE VISIT    ASSESSMENT & PLAN    Impression: 24 y.o. female roughly 6 weeks postop s/p right shoulder arthroscopic rotator interval closure with capsulorrhaphy for multidirectional instability, overall the patient is recovering well.    Plan:   At this time she will continue physical therapy as per protocol utilizing athletic training staff [facility].    I reviewed the intraoperative findings with the patient and answered all their questions. I reviewed their postoperative timeline and plan with them. They understand the postoperative precautions and the treatment plan going forward.     Follow-Up: Patient will follow-up in 6 weeks.    At the end of the visit, all questions were answered in full. The patient is in agreement with the plan and recommendations. They will call the office with any questions/concerns.      Note dictated with [employer] software. Completed without full typed error editing and sent to avoid delay.      SUBJECTIVE  CHIEF COMPLAINT:   Chief Complaint   Patient presents with    Right Shoulder - Post-op     EPV FUV sx 12-16-24        HPI: Neha Varela is a 24 y.o. patient. Neha Varela is now postop status post right shoulder arthroscopic rotator interval closure with pain capsulorrhaphy for multidirectional instability.  Overall, the patient is doing well.  She has no pain at this time and she is no longer on narcotic medication.  No reported issues with her surgical incisions.  She is utilizing the athletic training staff [facility] for physical therapy.  She feels she has regained significant motion.  No new injuries or falls.    REVIEW OF SYSTEMS  Constitutional: See HPI for pain assessment, No significant weight loss, recent trauma  Cardiovascular: No chest pain, shortness of breath  Respiratory: No difficulty breathing, cough  Gastrointestinal: No nausea,  vomiting, diarrhea, constipation  Musculoskeletal: Noted in HPI, positive for pain, restricted motion, stiffness  Integumentary: No rashes, easy bruising, redness   Neurological: no numbness or tingling in extremities, no gait disturbances   Psychiatric: No mood changes, memory changes, social issues  Heme/Lymph: no excessive swelling, easy bruising, excessive bleeding  ENT: No hearing changes  Eyes: No vision changes    CURRENT MEDICATIONS:   Current Outpatient Medications   Medication Sig Dispense Refill    ascorbic acid (Vitamin C) 500 mg tablet Take 1 tablet (500 mg) by mouth once daily.      cetirizine HCl (CETIRIZINE ORAL) Take by mouth once daily as needed. (Patient not taking: Reported on 12/16/2024)      CHOLECALCIFEROL, VITAMIN D3, ORAL Take by mouth once daily.      MAGNESIUM ORAL Take by mouth once daily.      triamcinolone (Kenalog) 0.1 % cream Apply to spots on the abdomen and arms 2x daily x 2 weeks then stop (Patient not taking: Reported on 12/16/2024) 80 g 0    ZINC ORAL Take by mouth once daily.       No current facility-administered medications for this visit.        OBJECTIVE    PHYSICAL EXAM      12/16/2024    11:55 AM 12/16/2024     2:55 PM 12/16/2024     3:00 PM 12/16/2024     3:05 PM 12/16/2024     3:20 PM 12/16/2024     3:22 PM 12/16/2024     3:45 PM   Vitals   Systolic 108 112 110 109 106 114 129   Diastolic 59 74 68 74 75 84 86   Heart Rate 75 84 70 65 66 60 60   Temp  36.1 °C (97 °F)   35.8 °C (96.4 °F) 35.8 °C (96.4 °F) 36.6 °C (97.9 °F)   Resp 16 13 12 13 14 13 18      There is no height or weight on file to calculate BMI.    General: Well-appearing, no acute distress    Skin intact bilateral upper and lower extremities  No erythema  No warmth    Detailed examination of the right shoulder demonstrates:  Surgical incision(s) healing well.  No erythema or warmth  No drainage  No ecchymosis  Resolving swelling, minimal  Biceps contour normal  Shoulder range of motion: Forward flexion 130,  abduction 100, external rotation 10.  Negative sulcus sign.  Upper extremity motor grossly intact  C5-T1 sensation intact bilaterally  2+ radial pulses bilaterally  Warm and well-perfused, brisk capillary refill    IMAGING:   No new imaging    Marvin Sun MD, MPH  Attending Surgeon    Sports Medicine Orthopaedic Surgery  [facility] [place] [place] [facility]

## 2025-03-25 ENCOUNTER — APPOINTMENT (OUTPATIENT)
Dept: ORTHOPEDIC SURGERY | Facility: CLINIC | Age: 25
End: 2025-03-25
Payer: COMMERCIAL

## 2025-03-25 DIAGNOSIS — Z98.890 STATUS POST LABRAL REPAIR OF SHOULDER: Primary | ICD-10-CM

## 2025-03-25 PROCEDURE — 99213 OFFICE O/P EST LOW 20 MIN: CPT | Performed by: STUDENT IN AN ORGANIZED HEALTH CARE EDUCATION/TRAINING PROGRAM

## 2025-03-25 ASSESSMENT — PAIN - FUNCTIONAL ASSESSMENT: PAIN_FUNCTIONAL_ASSESSMENT: NO/DENIES PAIN

## 2025-03-25 NOTE — PROGRESS NOTES
PRIMARY CARE PHYSICIAN: No primary care provider on file.    ORTHOPAEDIC POSTOPERATIVE VISIT    ASSESSMENT & PLAN    Impression: 24 y.o. female 3 months 9 days postop s/p right shoulder arthroscopic capsulorrhaphy with rotator interval closure for multidirectional instability, overall doing well.    Plan:   At this time, she will continue physical therapy as per protocol.  She will begin working on light strengthening.  She will progress as tolerated.    I reviewed the intraoperative findings with the patient and answered all their questions. I reviewed their postoperative timeline and plan with them. They understand the postoperative precautions and the treatment plan going forward.     Follow-Up: Patient will follow-up in 6/8 weeks for final clearance.    At the end of the visit, all questions were answered in full. The patient is in agreement with the plan and recommendations. They will call the office with any questions/concerns.      Note dictated with [employer] software. Completed without full typed error editing and sent to avoid delay.      SUBJECTIVE  CHIEF COMPLAINT:   Chief Complaint   Patient presents with    Right Shoulder - Post-op, Follow-up     Sx 12-16-24.        HPI: Neha Varela is a 24 y.o. patient. Neha Varela is now postop status post arthroscopic capsulorrhaphy and rotator interval closure for multidirectional instability.  Overall, the patient is doing very well.  She has no pain at this time and feels she is significantly improved.  She is beginning some light strengthening activities at this time.  She is unsure if she will return to dance/cheerleading this fall.  She denies any distal numbness or tingling.  No reported issues with her surgical incisions.    REVIEW OF SYSTEMS  Constitutional: See HPI for pain assessment, No significant weight loss, recent trauma  Cardiovascular: No chest pain, shortness of breath  Respiratory: No difficulty breathing,  cough  Gastrointestinal: No nausea, vomiting, diarrhea, constipation  Musculoskeletal: Noted in HPI, positive for pain, restricted motion, stiffness  Integumentary: No rashes, easy bruising, redness   Neurological: no numbness or tingling in extremities, no gait disturbances   Psychiatric: No mood changes, memory changes, social issues  Heme/Lymph: no excessive swelling, easy bruising, excessive bleeding  ENT: No hearing changes  Eyes: No vision changes    CURRENT MEDICATIONS:   Current Outpatient Medications   Medication Sig Dispense Refill    ascorbic acid (Vitamin C) 500 mg tablet Take 1 tablet (500 mg) by mouth once daily.      cetirizine HCl (CETIRIZINE ORAL) Take by mouth once daily as needed. (Patient not taking: Reported on 12/16/2024)      CHOLECALCIFEROL, VITAMIN D3, ORAL Take by mouth once daily.      MAGNESIUM ORAL Take by mouth once daily.      triamcinolone (Kenalog) 0.1 % cream Apply to spots on the abdomen and arms 2x daily x 2 weeks then stop (Patient not taking: Reported on 12/16/2024) 80 g 0    ZINC ORAL Take by mouth once daily.       No current facility-administered medications for this visit.        OBJECTIVE    PHYSICAL EXAM      12/16/2024    11:55 AM 12/16/2024     2:55 PM 12/16/2024     3:00 PM 12/16/2024     3:05 PM 12/16/2024     3:20 PM 12/16/2024     3:22 PM 12/16/2024     3:45 PM   Vitals   Systolic 108 112 110 109 106 114 129   Diastolic 59 74 68 74 75 84 86   Heart Rate 75 84 70 65 66 60 60   Temp  36.1 °C (97 °F)   35.8 °C (96.4 °F) 35.8 °C (96.4 °F) 36.6 °C (97.9 °F)   Resp 16 13 12 13 14 13 18      There is no height or weight on file to calculate BMI.    General: Well-appearing, no acute distress    Skin intact bilateral upper and lower extremities  No erythema  No warmth    Detailed examination of the right shoulder demonstrates:  Surgical incision(s) healing well.  No erythema or warmth  No drainage  No ecchymosis  Resolving swelling, minimal  Biceps contour normal  Shoulder  range of motion: Forward flexion 150, abduction 140, external rotation 20 degrees.  Negative sulcus sign.  Upper extremity motor grossly intact  C5-T1 sensation intact bilaterally  2+ radial pulses bilaterally  Warm and well-perfused, brisk capillary refill    IMAGING:   No new imaging    Marvin Sun MD, MPH  Attending Surgeon    Sports Medicine Orthopaedic Surgery  [facility]

## 2025-05-20 ENCOUNTER — APPOINTMENT (OUTPATIENT)
Dept: ORTHOPEDIC SURGERY | Facility: CLINIC | Age: 25
End: 2025-05-20
Payer: COMMERCIAL

## 2025-05-20 DIAGNOSIS — Z98.890 STATUS POST LABRAL REPAIR OF SHOULDER: Primary | ICD-10-CM

## 2025-05-20 PROCEDURE — 99213 OFFICE O/P EST LOW 20 MIN: CPT | Performed by: STUDENT IN AN ORGANIZED HEALTH CARE EDUCATION/TRAINING PROGRAM

## 2025-05-27 NOTE — PROGRESS NOTES
PRIMARY CARE PHYSICIAN: No primary care provider on file.    ORTHOPAEDIC POSTOPERATIVE VISIT    ASSESSMENT & PLAN    Impression: 24 y.o. female status post right shoulder arthroscopic capsular shift with rotator interval closure for multidirectional instability performed 12/16/2024.  Overall, she has achieved full recovery.  I have no restrictions for her at this time.    Plan:   No restrictions at this time.    I reviewed the intraoperative findings with the patient and answered all their questions. I reviewed their postoperative timeline and plan with them. They understand the postoperative precautions and the treatment plan going forward.     Follow-Up: Patient will follow-up as needed moving forward.    At the end of the visit, all questions were answered in full. The patient is in agreement with the plan and recommendations. They will call the office with any questions/concerns.    Note dictated with Card[employer] software. Completed without full typed error editing and sent to avoid delay.    SUBJECTIVE  CHIEF COMPLAINT:   Chief Complaint   Patient presents with    Right Shoulder - Follow-up     Sx 12/16/24-labrum repair-No pain-back to ADLs        HPI: Neha Varela is a 24 y.o. patient. Neha Varela is now status post right shoulder capsular shift and rotator interval closure performed on 12/16/2024 for multidirectional instability, overall, the patient has recovered very well.  She has no restrictions at this time.  No pain in activities of daily living.  No reported issues with her surgical incisions.  No reports of distal numbness or tingling.    REVIEW OF SYSTEMS  Constitutional: See HPI for pain assessment, No significant weight loss, recent trauma  Cardiovascular: No chest pain, shortness of breath  Respiratory: No difficulty breathing, cough  Gastrointestinal: No nausea, vomiting, diarrhea, constipation  Musculoskeletal: Noted in HPI, positive for pain, restricted motion,  stiffness  Integumentary: No rashes, easy bruising, redness   Neurological: no numbness or tingling in extremities, no gait disturbances   Psychiatric: No mood changes, memory changes, social issues  Heme/Lymph: no excessive swelling, easy bruising, excessive bleeding  ENT: No hearing changes  Eyes: No vision changes    CURRENT MEDICATIONS:   Current Medications[1]     OBJECTIVE    PHYSICAL EXAM      12/16/2024    11:55 AM 12/16/2024     2:55 PM 12/16/2024     3:00 PM 12/16/2024     3:05 PM 12/16/2024     3:20 PM 12/16/2024     3:22 PM 12/16/2024     3:45 PM   Vitals   Systolic 108 112 110 109 106 114 129   Diastolic 59 74 68 74 75 84 86   Heart Rate 75 84 70 65 66 60 60   Temp  36.1 °C (97 °F)   35.8 °C (96.4 °F) 35.8 °C (96.4 °F) 36.6 °C (97.9 °F)   Resp 16 13 12 13 14 13 18      There is no height or weight on file to calculate BMI.    General: Well-appearing, no acute distress    Skin intact bilateral upper and lower extremities  No erythema  No warmth    Detailed examination of the right shoulder demonstrates:  Surgical incision(s) healing well.  No erythema or warmth  No drainage  No ecchymosis  Resolving swelling, minimal  Biceps contour normal  Shoulder range of motion: Forward flexion 150, abduction 140, external rotation 20 degrees.  Negative sulcus sign.  Upper extremity motor grossly intact  C5-T1 sensation intact bilaterally  2+ radial pulses bilaterally  Warm and well-perfused, brisk capillary refill    IMAGING:   No new imaging    Marvin Sun MD, MPH  Attending Surgeon    Sports Medicine Orthopaedic Surgery  [facility] [place]  [facility] [employer]        [1]   Current Outpatient Medications   Medication Sig Dispense Refill    ascorbic acid (Vitamin C) 500 mg tablet Take 1 tablet (500 mg) by mouth once daily.      cetirizine HCl (CETIRIZINE ORAL) Take by mouth once daily as needed. (Patient not taking:  Reported on 12/16/2024)      CHOLECALCIFEROL, VITAMIN D3, ORAL Take by mouth once daily.      MAGNESIUM ORAL Take by mouth once daily.      triamcinolone (Kenalog) 0.1 % cream Apply to spots on the abdomen and arms 2x daily x 2 weeks then stop (Patient not taking: Reported on 12/16/2024) 80 g 0    ZINC ORAL Take by mouth once daily.       No current facility-administered medications for this visit.

## (undated) DEVICE — Device

## (undated) DEVICE — SUTURE, VICRYL, 2-0, 18 IN CP-2, UNDYED

## (undated) DEVICE — GLOVE, SURGICAL, PROTEXIS MICRO, 8.0, PF, LATEX

## (undated) DEVICE — PROBE, APOLLO RF, 90 DEG, EXTRA LARTGE

## (undated) DEVICE — SUTURELASSO, 25D TIGHT CRV LEFT

## (undated) DEVICE — BONECUTTER, COOLCUT TORPEDO, 4.0MM X 13CM

## (undated) DEVICE — STRIP, SKIN CLOSURE, STERI STRIP, REINFORCED, 0.5 X 4 IN

## (undated) DEVICE — TUBING, PATIENT 8FT STERILE

## (undated) DEVICE — DRESSING, MOISTURE VAPOR PERMEABLE, TEGADERM, 2X2-3/4IN, TRANSPARENT

## (undated) DEVICE — SUTURELASSO, 45 DEGREE CRV LEFT

## (undated) DEVICE — SUTURELASSO, 25D TIGHT CRV RIGHT

## (undated) DEVICE — SLEEVE, STAR, VELCRO

## (undated) DEVICE — SUTURE, MONOCRYL, 3-0, 27 IN, PS-2, UNDYED

## (undated) DEVICE — DRESSING, GAUZE, SPONGE, 16 PLY, CURITY, 4 X 4 IN, BULK, NS

## (undated) DEVICE — CANNULA, GEMINI SR8

## (undated) DEVICE — SUTURELASSO, 45 DEGREE CRV RIGHT

## (undated) DEVICE — DRESSING, TRANSPARENT, TEGADERM, FRAME STYLE, 4 X 4.5, STRL

## (undated) DEVICE — BLANKET, LOWER BODY, VHA PLUS, ADULT

## (undated) DEVICE — CANNULA, ARTHRO LOW PROFILE 5MM ID X 7CM

## (undated) DEVICE — GLOVE, SURGICAL, PROTEXIS PI , 8.5, PF, LF

## (undated) DEVICE — FIBERTAK, CURVED KIT, DISP

## (undated) DEVICE — CANNULA, TRIPLE-DAM TWIST-IN, 7MM X 7CM, VALVED